# Patient Record
Sex: MALE | Race: BLACK OR AFRICAN AMERICAN | NOT HISPANIC OR LATINO | Employment: FULL TIME | URBAN - NONMETROPOLITAN AREA
[De-identification: names, ages, dates, MRNs, and addresses within clinical notes are randomized per-mention and may not be internally consistent; named-entity substitution may affect disease eponyms.]

---

## 2020-06-06 ENCOUNTER — APPOINTMENT (OUTPATIENT)
Dept: GENERAL RADIOLOGY | Facility: HOSPITAL | Age: 30
End: 2020-06-06

## 2020-06-06 ENCOUNTER — HOSPITAL ENCOUNTER (EMERGENCY)
Facility: HOSPITAL | Age: 30
Discharge: HOME | End: 2020-06-06
Attending: EMERGENCY MEDICINE | Admitting: EMERGENCY MEDICINE

## 2020-06-06 VITALS
OXYGEN SATURATION: 99 % | WEIGHT: 153 LBS | HEIGHT: 72 IN | DIASTOLIC BLOOD PRESSURE: 86 MMHG | BODY MASS INDEX: 20.72 KG/M2 | TEMPERATURE: 98.2 F | RESPIRATION RATE: 17 BRPM | SYSTOLIC BLOOD PRESSURE: 118 MMHG | HEART RATE: 90 BPM

## 2020-06-06 DIAGNOSIS — R00.0 TACHYCARDIA: ICD-10-CM

## 2020-06-06 DIAGNOSIS — R00.2 PALPITATION: Primary | ICD-10-CM

## 2020-06-06 LAB
ALBUMIN SERPL-MCNC: 5 G/DL (ref 3.5–5.2)
ALBUMIN/GLOB SERPL: 1.4 G/DL
ALP SERPL-CCNC: 71 U/L (ref 39–117)
ALT SERPL W P-5'-P-CCNC: 13 U/L (ref 1–41)
AMPHET+METHAMPHET UR QL: NEGATIVE
AMPHETAMINES UR QL: NEGATIVE
ANION GAP SERPL CALCULATED.3IONS-SCNC: 18 MMOL/L (ref 5–15)
AST SERPL-CCNC: 35 U/L (ref 1–40)
BARBITURATES UR QL SCN: NEGATIVE
BASOPHILS # BLD AUTO: 0.05 10*3/MM3 (ref 0–0.2)
BASOPHILS NFR BLD AUTO: 0.7 % (ref 0–1.5)
BENZODIAZ UR QL SCN: NEGATIVE
BILIRUB SERPL-MCNC: 0.6 MG/DL (ref 0.2–1.2)
BUN BLD-MCNC: 12 MG/DL (ref 6–20)
BUN/CREAT SERPL: 14.8 (ref 7–25)
BUPRENORPHINE SERPL-MCNC: NEGATIVE NG/ML
CALCIUM SPEC-SCNC: 10 MG/DL (ref 8.6–10.5)
CANNABINOIDS SERPL QL: NEGATIVE
CHLORIDE SERPL-SCNC: 98 MMOL/L (ref 98–107)
CO2 SERPL-SCNC: 24 MMOL/L (ref 22–29)
COCAINE UR QL: NEGATIVE
CREAT BLD-MCNC: 0.81 MG/DL (ref 0.76–1.27)
DEPRECATED RDW RBC AUTO: 39.1 FL (ref 37–54)
EOSINOPHIL # BLD AUTO: 0.09 10*3/MM3 (ref 0–0.4)
EOSINOPHIL NFR BLD AUTO: 1.3 % (ref 0.3–6.2)
ERYTHROCYTE [DISTWIDTH] IN BLOOD BY AUTOMATED COUNT: 12.9 % (ref 12.3–15.4)
GFR SERPL CREATININE-BSD FRML MDRD: 111 ML/MIN/1.73
GFR SERPL CREATININE-BSD FRML MDRD: 135 ML/MIN/1.73
GLOBULIN UR ELPH-MCNC: 3.5 GM/DL
GLUCOSE BLD-MCNC: 122 MG/DL (ref 65–99)
HCT VFR BLD AUTO: 42.1 % (ref 37.5–51)
HGB BLD-MCNC: 14.3 G/DL (ref 13–17.7)
HOLD SPECIMEN: NORMAL
HOLD SPECIMEN: NORMAL
IMM GRANULOCYTES # BLD AUTO: 0.02 10*3/MM3 (ref 0–0.05)
IMM GRANULOCYTES NFR BLD AUTO: 0.3 % (ref 0–0.5)
LYMPHOCYTES # BLD AUTO: 1.69 10*3/MM3 (ref 0.7–3.1)
LYMPHOCYTES NFR BLD AUTO: 24.8 % (ref 19.6–45.3)
MCH RBC QN AUTO: 28.8 PG (ref 26.6–33)
MCHC RBC AUTO-ENTMCNC: 34 G/DL (ref 31.5–35.7)
MCV RBC AUTO: 84.9 FL (ref 79–97)
METHADONE UR QL SCN: NEGATIVE
MONOCYTES # BLD AUTO: 0.48 10*3/MM3 (ref 0.1–0.9)
MONOCYTES NFR BLD AUTO: 7 % (ref 5–12)
NEUTROPHILS # BLD AUTO: 4.48 10*3/MM3 (ref 1.7–7)
NEUTROPHILS NFR BLD AUTO: 65.9 % (ref 42.7–76)
NRBC BLD AUTO-RTO: 0 /100 WBC (ref 0–0.2)
OPIATES UR QL: NEGATIVE
OXYCODONE UR QL SCN: NEGATIVE
PCP UR QL SCN: NEGATIVE
PLATELET # BLD AUTO: 254 10*3/MM3 (ref 140–450)
PMV BLD AUTO: 10.5 FL (ref 6–12)
POTASSIUM BLD-SCNC: 3.6 MMOL/L (ref 3.5–5.2)
PROPOXYPH UR QL: NEGATIVE
PROT SERPL-MCNC: 8.5 G/DL (ref 6–8.5)
RBC # BLD AUTO: 4.96 10*6/MM3 (ref 4.14–5.8)
SODIUM BLD-SCNC: 140 MMOL/L (ref 136–145)
TRICYCLICS UR QL SCN: NEGATIVE
TROPONIN T SERPL-MCNC: <0.01 NG/ML (ref 0–0.03)
WBC NRBC COR # BLD: 6.81 10*3/MM3 (ref 3.4–10.8)
WHOLE BLOOD HOLD SPECIMEN: NORMAL
WHOLE BLOOD HOLD SPECIMEN: NORMAL

## 2020-06-06 PROCEDURE — 93005 ELECTROCARDIOGRAM TRACING: CPT | Performed by: EMERGENCY MEDICINE

## 2020-06-06 PROCEDURE — 84484 ASSAY OF TROPONIN QUANT: CPT | Performed by: EMERGENCY MEDICINE

## 2020-06-06 PROCEDURE — 80053 COMPREHEN METABOLIC PANEL: CPT | Performed by: EMERGENCY MEDICINE

## 2020-06-06 PROCEDURE — 80306 DRUG TEST PRSMV INSTRMNT: CPT | Performed by: EMERGENCY MEDICINE

## 2020-06-06 PROCEDURE — 71045 X-RAY EXAM CHEST 1 VIEW: CPT

## 2020-06-06 PROCEDURE — 99284 EMERGENCY DEPT VISIT MOD MDM: CPT

## 2020-06-06 PROCEDURE — 93010 ELECTROCARDIOGRAM REPORT: CPT | Performed by: INTERNAL MEDICINE

## 2020-06-06 PROCEDURE — 85025 COMPLETE CBC W/AUTO DIFF WBC: CPT | Performed by: EMERGENCY MEDICINE

## 2020-06-06 RX ADMIN — SODIUM CHLORIDE 500 ML: 9 INJECTION, SOLUTION INTRAVENOUS at 12:25

## 2020-06-06 NOTE — ED PROVIDER NOTES
Subjective   Patient states that he was on a liquid diet and today was making some smoothie for himself and starting feeling tachycardic no loss of consciousness was seen in urgent care reportedly heart rate 180 was sent to the ER in the ER his heart rate is 118 he states that probably because of the beautiful nurses are taking care of him his heart rate has subsided      Palpitations   Palpitations quality:  Regular  Onset quality:  Sudden  Timing:  Constant  Progression:  Unchanged  Chronicity:  New  Context: not anxiety, not blood loss, not bronchodilators, not dehydration, not exercise, not illicit drugs, not nicotine and not stimulant use    Relieved by:  Nothing  Worsened by:  Nothing  Ineffective treatments:  None tried  Associated symptoms: no back pain, no chest pain, no chest pressure, no cough, no diaphoresis, no dizziness, no lower extremity edema, no malaise/fatigue, no nausea, no near-syncope, no numbness, no PND, no shortness of breath, no syncope, no vomiting and no weakness    Risk factors: no diabetes mellitus, no heart disease, no hx of PE, no hx of thyroid disease and no hyperthyroidism        Review of Systems   Constitutional: Negative.  Negative for chills, diaphoresis, fatigue, fever and malaise/fatigue.   HENT: Negative.  Negative for congestion.    Respiratory: Negative.  Negative for cough, chest tightness, shortness of breath and stridor.    Cardiovascular: Positive for palpitations. Negative for chest pain, syncope, PND and near-syncope.   Gastrointestinal: Negative.  Negative for abdominal distention, abdominal pain, nausea and vomiting.   Endocrine: Negative.    Genitourinary: Negative.  Negative for difficulty urinating and flank pain.   Musculoskeletal: Negative.  Negative for back pain.   Skin: Negative.  Negative for color change.   Neurological: Negative.  Negative for dizziness, weakness, numbness and headaches.   All other systems reviewed and are negative.      Past Medical  History:   Diagnosis Date   • Anxiety        No Known Allergies    History reviewed. No pertinent surgical history.    History reviewed. No pertinent family history.    Social History     Socioeconomic History   • Marital status: Single     Spouse name: Not on file   • Number of children: Not on file   • Years of education: Not on file   • Highest education level: Not on file   Tobacco Use   • Smoking status: Former Smoker   • Tobacco comment: 4 years ago   Substance and Sexual Activity   • Alcohol use: Yes     Comment: occ   • Drug use: Never           Objective   Physical Exam   Constitutional: He is oriented to person, place, and time. He appears well-developed.  Non-toxic appearance.   HENT:   Head: Normocephalic and atraumatic.   Mouth/Throat: Uvula is midline and mucous membranes are normal.   Eyes: Pupils are equal, round, and reactive to light. Conjunctivae and lids are normal. Lids are everted and swept, no foreign bodies found.   Neck: Trachea normal, normal range of motion, full passive range of motion without pain and phonation normal. Neck supple. Normal carotid pulses and no JVD present. Carotid bruit is not present. No neck rigidity. No tracheal deviation present.   Cardiovascular: Regular rhythm, normal heart sounds, intact distal pulses and normal pulses. Tachycardia present. PMI is not displaced.   Pulmonary/Chest: Effort normal and breath sounds normal. No accessory muscle usage or stridor. No apnea and no tachypnea. He has no decreased breath sounds. He has no wheezes. He has no rhonchi. He has no rales.   Abdominal: Soft. Normal appearance, normal aorta and bowel sounds are normal. There is no hepatosplenomegaly. There is no tenderness.   Musculoskeletal: Normal range of motion.   Lower extremity exam bilaterally is unremarkable.  There is no right or left calf tenderness .  There is no palpable venous cord.  No obvious difference in the size of the legs.  No pitting edema.  The dorsalis pedis  and posterior tibial femoral and popliteal pulses are palpable and +2 bilaterally.  Homans sign is negative   Neurological: He is alert and oriented to person, place, and time. He has normal strength and normal reflexes. He displays normal reflexes. No cranial nerve deficit or sensory deficit. Gait normal. GCS eye subscore is 4. GCS verbal subscore is 5. GCS motor subscore is 6.   Skin: Skin is warm, dry and intact. No cyanosis. No pallor. Nails show no clubbing.   Psychiatric: He has a normal mood and affect. His speech is normal and behavior is normal.   Nursing note and vitals reviewed.      Procedures           ED Course  ED Course as of Jun 06 1354   Sat Jun 06, 2020   1349 Case discussed at length with the patient EKG shows normal sinus rhythm without any acute abnormality or pathology there is no fast heart rate currently.  It may be possible the patient may have had narrow complex tachycardia I have discussed this at length and will discharge him home on a Holter monitor and echo    [TS]   1350 Risks and benefits of treatments given and alternative treatment options discussed with patient/family. I answered all the questions in simple, plain language, and there was voiced understanding and agreement with plan of care. There were no further questions. Differential diagnosis discussed. Patient/family was advised that the practice of medicine is not always an exact science, and sometimes tests, physical exam, or history may not show the underlying conditions with certainty. Additionally, the condition may change or show itself later after initial presentation. There was also expressed understanding and agreement with this limitation of emergency medicine practice. Patient/family was asked to return to ED if any problem or issues or if condition worsens or does not improved. Patient/family agreed to follow up with PCP/specialist as advised, or return to ED if unable to see a provider in a timely fashion for  continued symptoms.     [TS]   1351  The patient's symptoms are now better.  Patient is not having pain.  No chest pain, difficulty breathing, nausea, vomiting or palpitations.  No anxiety or dizziness.  Vital signs have been reviewed and appear to be correct.  Physical exam findings are improved.  Alert.  Oriented X3 .  No acute distress.  Breath sounds normal.  No respiratory distress, decreased breath sounds or wheezes.  Normal heart rate and rhythm.  Heart sounds normal.  .  Abdomen soft and nontender.  No abdominal tenderness or guarding or rebound tenderness.  Skin warm and dry.  No cyanosis or diaphoresis.  Oriented X 3.  Not anxious.  No alteration in mental status or weakness.            [TS]      ED Course User Index  [TS] Alvaro Chang MD                                           MDM  Number of Diagnoses or Management Options  Diagnosis management comments: Could have had an SVT or any other narrow complex tachycardia       Amount and/or Complexity of Data Reviewed  Clinical lab tests: ordered and reviewed  Tests in the radiology section of CPT®: ordered and reviewed  Tests in the medicine section of CPT®: ordered and reviewed        Final diagnoses:   Palpitation   Tachycardia            Alvaro Chang MD  06/06/20 4000

## 2020-06-06 NOTE — DISCHARGE INSTRUCTIONS
Follow up with one of the [facility] physician groups below to setup primary care. If you have trouble making an appointment, please call the [facility] Nurse Line at [phone]    Dr. Gila Smith DO, Dr. Chandler Mejía DO, and KATIA Hagan  [facility]  [address] [place] [address], [id number] [phone]    Dr. Rodolfo Stevens MD  [facility] Internal Medicine - [address] [address], [address], [place], [place] [id number] [phone]    Dr. Angel Diaz DO, Dr. Jeffery Mercer DO,  KATIA Yu, and KATIA Baldwin  [facility] - [address] [address], [address], [place], [place] [id number] [phone]     Dr. Consuelo Maradiaga MD, and KATIA Miranda  [address] [id number] [phone]    Dr. Ricky Schultz MD and Dr. Nabeel Denis MD  [address], [address]  [phone]    Dr. Bao Chanel MD  [facility]  [address] [place] [address], [address], [place], [place], [address] [phone]    Dr. Fredi Dawn MD  [facility]  [address], [place], [address] [phone]                Palpitations  Palpitations are feelings that your heartbeat is irregular or is faster than normal. It may feel like your heart is fluttering or skipping a beat. Palpitations are usually not a serious problem. They may be caused by many things, including smoking, caffeine, alcohol, stress, and certain medicines or drugs. Most causes of palpitations are not serious. However, some palpitations can be a sign of a serious problem. You may need further tests to rule out serious medical problems.  Follow these instructions at home:         Pay  attention to any changes in your condition. Take these actions to help manage your symptoms:  Eating and drinking  · Avoid foods and drinks that may cause palpitations. These may include:  ? Caffeinated coffee, tea, soft drinks, diet pills, and energy drinks.  ? Chocolate.  ? Alcohol.  Lifestyle  · Take steps to reduce your stress and anxiety. Things that can help you relax include:  ? Yoga.  ? Mind-body activities, such as deep breathing, meditation, or using words and images to create positive thoughts (guided imagery).  ? Physical activity, such as swimming, jogging, or walking. Tell your health care provider if your palpitations increase with activity. If you have chest pain or shortness of breath with activity, do not continue the activity until you are seen by your health care provider.  ? Biofeedback. This is a method that helps you learn to use your mind to control things in your body, such as your heartbeat.  · Do not use drugs, including cocaine or ecstasy. Do not use marijuana.  · Get plenty of rest and sleep. Keep a regular bed time.  General instructions  · Take over-the-counter and prescription medicines only as told by your health care provider.  · Do not use any products that contain nicotine or tobacco, such as cigarettes and e-cigarettes. If you need help quitting, ask your health care provider.  · Keep all follow-up visits as told by your health care provider. This is important. These may include visits for further testing if palpitations do not go away or get worse.  Contact a health care provider if you:  · Continue to have a fast or irregular heartbeat after 24 hours.  · Notice that your palpitations occur more often.  Get help right away if you:  · Have chest pain or shortness of breath.  · Have a severe headache.  · Feel dizzy or you faint.  Summary  · Palpitations are feelings that your heartbeat is irregular or is faster than normal. It may feel like your heart is fluttering or skipping a  beat.  · Palpitations may be caused by many things, including smoking, caffeine, alcohol, stress, certain medicines, and drugs.  · Although most causes of palpitations are not serious, some causes can be a sign of a serious medical problem.  · Get help right away if you faint or have chest pain, shortness of breath, a severe headache, or dizziness.  This information is not intended to replace advice given to you by your health care provider. Make sure you discuss any questions you have with your health care provider.  Document Released: 12/15/2001 Document Revised: 01/30/2019 Document Reviewed: 01/30/2019  Elsevier Patient Education © 2020 [employer]

## 2020-06-16 ENCOUNTER — OFFICE VISIT (OUTPATIENT)
Dept: INTERNAL MEDICINE | Facility: CLINIC | Age: 30
End: 2020-06-16

## 2020-06-16 VITALS
SYSTOLIC BLOOD PRESSURE: 120 MMHG | RESPIRATION RATE: 18 BRPM | HEART RATE: 84 BPM | HEIGHT: 72 IN | BODY MASS INDEX: 20.83 KG/M2 | OXYGEN SATURATION: 99 % | WEIGHT: 153.8 LBS | TEMPERATURE: 98.5 F | DIASTOLIC BLOOD PRESSURE: 76 MMHG

## 2020-06-16 DIAGNOSIS — Z11.59 ENCOUNTER FOR HEPATITIS C SCREENING TEST FOR LOW RISK PATIENT: ICD-10-CM

## 2020-06-16 DIAGNOSIS — F41.9 ANXIETY: ICD-10-CM

## 2020-06-16 DIAGNOSIS — Z13.31 DEPRESSION SCREEN: ICD-10-CM

## 2020-06-16 DIAGNOSIS — Z13.6 HYPERTENSION SCREEN: ICD-10-CM

## 2020-06-16 DIAGNOSIS — Z00.00 ENCOUNTER FOR PREVENTIVE CARE: Primary | ICD-10-CM

## 2020-06-16 PROCEDURE — 99385 PREV VISIT NEW AGE 18-39: CPT | Performed by: INTERNAL MEDICINE

## 2020-06-16 RX ORDER — UBIDECARENONE 100 MG
100 CAPSULE ORAL DAILY
COMMUNITY
End: 2021-01-15

## 2020-06-16 RX ORDER — ECHINACEA 400 MG
1000 CAPSULE ORAL DAILY
COMMUNITY
End: 2021-01-15

## 2020-06-16 NOTE — PROGRESS NOTES
Chief Complaint   Patient presents with   • Establish Care   • Anxiety     Answers for HPI/ROS submitted by the patient on 6/14/2020   What is the primary reason for your visit?: Other  Please describe your symptoms.: no symptoms. just a routine check up  Have you had these symptoms before?: No  How long have you been having these symptoms?: 1-4 days      History:  Jamal Hutchinson is a 30 y.o. male who presents today for evaluation of the above problems.      Patient is here to establish care with me.  He was recently seen at both urgent care in the emergency room on June 6, 2020.  He had some dizziness, and felt like he might be having a panic attack.  EKG showed normal sinus rhythm without acute abnormality.  He was discharged with Holter monitor and order for echocardiogram.  After treatment in the ER his symptoms improved.  He was referred here to establish care.    I reviewed his most recent labs from June 6, 2020 which showed mild increase in anion gap, mild hyperglycemia, normal urine tox screen.  Chest xray was normal    On his birthday he went to [place].  Afterwards he had a juicer for his birthday and he went on a cleanse for 3 days only drinking juice and water.  He was feeling well and then all of a sudden while at work he developed heart racing.  Felt like anxiety and had to previous panic attack.  He has a 2D echo and Holter monitor scheduled for June 23, 2020    Currently he feels well although has some anxiety related to fear of having more anxiety attacks for tachycardia attacks.  He recently picked up a book that he is reading to help with his anxiety.  He does not have a counselor yet.    There is no family history of coronary artery disease, sudden cardiac death, diabetes, hypertension or other issues.  Denies any history of colon or prostate cancer        HPI    ROS:  Review of Systems   Constitutional: Negative for activity change, appetite change, fatigue, fever and unexpected weight change.    HENT: Negative for nosebleeds, sore throat and trouble swallowing.    Eyes: Negative for pain and visual disturbance.   Respiratory: Negative for cough, chest tightness and shortness of breath.    Cardiovascular: Positive for palpitations. Negative for chest pain and leg swelling.   Gastrointestinal: Negative for abdominal pain, constipation, diarrhea, nausea and vomiting.   Endocrine: Negative for cold intolerance and heat intolerance.   Genitourinary: Negative for hematuria.   Musculoskeletal: Negative.  Negative for back pain, neck pain and neck stiffness.   Skin: Negative for rash and wound.   Neurological: Negative for dizziness, syncope, weakness, light-headedness and headaches.   Hematological: Negative for adenopathy. Does not bruise/bleed easily.   Psychiatric/Behavioral: Negative for agitation, behavioral problems and confusion. The patient is nervous/anxious.        No Known Allergies  Past Medical History:   Diagnosis Date   • Anxiety      History reviewed. No pertinent surgical history.  Family History   Problem Relation Age of Onset   • Anxiety disorder Mother    • Cancer Mother    • Cancer Father      Jamal  reports that he has quit smoking. His smoking use included cigarettes. He has a 7.00 pack-year smoking history. He has never used smokeless tobacco. He reports that he drinks alcohol. He reports that he does not use drugs.    I have reviewed and updated the above documentation (if necessary) including but not limited to chief complaint, ROS, PFSH, allergies and medications      Current Outpatient Medications:   •  CHLOROPHYLL PO, Take 60 mg by mouth Daily., Disp: , Rfl:   •  coenzyme Q10 100 MG capsule, Take 100 mg by mouth Daily., Disp: , Rfl:   •  Flaxseed, Linseed, (FLAXSEED OIL) 1000 MG capsule, Take 1,000 mg by mouth Daily., Disp: , Rfl:   •  Vitamins-Lipotropics (B-100 PO), Take 1 tablet by mouth Daily., Disp: , Rfl:     PHQ-9 Depression Screening  Little interest or pleasure in doing  "things? 0   Feeling down, depressed, or hopeless? 0   Trouble falling or staying asleep, or sleeping too much?     Feeling tired or having little energy?     Poor appetite or overeating?     Feeling bad about yourself - or that you are a failure or have let yourself or your family down?     Trouble concentrating on things, such as reading the newspaper or watching television?     Moving or speaking so slowly that other people could have noticed? Or the opposite - being so fidgety or restless that you have been moving around a lot more than usual?     Thoughts that you would be better off dead, or of hurting yourself in some way?     PHQ-9 Total Score 0   If you checked off any problems, how difficult have these problems made it for you to do your work, take care of things at home, or get along with other people?       PHQ-9 Total Score: 0    OBJECTIVE:  Visit Vitals  /76 (BP Location: Left arm, Patient Position: Sitting, Cuff Size: Adult)   Pulse 84   Temp 98.5 °F (36.9 °C) (Oral)   Resp 18   Ht 182.9 cm (72.01\")   Wt 69.8 kg (153 lb 12.8 oz)   SpO2 99%   BMI 20.85 kg/m²      Physical Exam   Constitutional: He appears well-developed and well-nourished. No distress.   Very pleasant   HENT:   Head: Normocephalic and atraumatic.   Right Ear: External ear normal.   Left Ear: External ear normal.   Mouth/Throat: Oropharynx is clear and moist.   Eyes: Pupils are equal, round, and reactive to light. EOM are normal. No scleral icterus.   Neck: Phonation normal. No thyromegaly present.   Cardiovascular: Normal rate, regular rhythm, normal heart sounds and intact distal pulses. Exam reveals no friction rub.   No murmur heard.  Pulmonary/Chest: Effort normal and breath sounds normal. No stridor. No respiratory distress. He has no wheezes. He has no rales.   Abdominal: Soft. Bowel sounds are normal. He exhibits no distension. There is no tenderness.   Neurological: He is alert.   Skin: Skin is dry. No erythema. No pallor. "   Psychiatric: He has a normal mood and affect. His behavior is normal. Judgment and thought content normal.   Vitals reviewed.      MDM    Assessment/Plan    Jamal was seen today for establish care and anxiety.    Diagnoses and all orders for this visit:    Encounter for preventive care  -     Lipid panel; Future    Anxiety    Encounter for hepatitis C screening test for low risk patient  -     Hepatitis C antibody; Future    Depression screen    Hypertension screen      I suspect his episode of tachycardia was related to a panic attack.  However, his heart rate was 150 and I am interested to see if he did have an episode of SVT.  He already has 2D echo and Holter monitor ordered.  I did review his EKG from June 6.  This was a sinus tachycardia.  There is no family history of sudden cardiac death or other cardiac issues.    His hypertension and depression screen are both negative    Would like to check a lipid panel and hepatitis C as above    Follow-up in 1 year as long as work-up above is within normal limits    Patient's Body mass index is 20.85 kg/m². BMI is within normal parameters. No follow-up required..      Education materials and an After Visit Summary were printed and given to the patient at discharge.  Return in about 1 year (around 6/16/2021) for Annual physical.         SIMONA Stevens MD   2:24 PM  6/16/2020

## 2020-06-16 NOTE — PATIENT INSTRUCTIONS
Living With Anxiety    After being diagnosed with an anxiety disorder, you may be relieved to know why you have felt or behaved a certain way. It is natural to also feel overwhelmed about the treatment ahead and what it will mean for your life. With care and support, you can manage this condition and recover from it.  How to cope with anxiety  Dealing with stress  Stress is your body’s reaction to life changes and events, both good and bad. Stress can last just a few hours or it can be ongoing. Stress can play a major role in anxiety, so it is important to learn both how to cope with stress and how to think about it differently.  Talk with your health care provider or a counselor to learn more about stress reduction. He or she may suggest some stress reduction techniques, such as:  · Music therapy. This can include creating or listening to music that you enjoy and that inspires you.  · Mindfulness-based meditation. This involves being aware of your normal breaths, rather than trying to control your breathing. It can be done while sitting or walking.  · Centering prayer. This is a kind of meditation that involves focusing on a word, phrase, or sacred image that is meaningful to you and that brings you peace.  · Deep breathing. To do this, expand your stomach and inhale slowly through your nose. Hold your breath for 3-5 seconds. Then exhale slowly, allowing your stomach muscles to relax.  · Self-talk. This is a skill where you identify thought patterns that lead to anxiety reactions and correct those thoughts.  · Muscle relaxation. This involves tensing muscles then relaxing them.  Choose a stress reduction technique that fits your lifestyle and personality. Stress reduction techniques take time and practice. Set aside 5-15 minutes a day to do them. Therapists can offer training in these techniques. The training may be covered by some insurance plans. Other things you can do to manage stress include:  · Keeping a  stress diary. This can help you learn what triggers your stress and ways to control your response.  · Thinking about how you respond to certain situations. You may not be able to control everything, but you can control your reaction.  · Making time for activities that help you relax, and not feeling guilty about spending your time in this way.  Therapy combined with coping and stress-reduction skills provides the best chance for successful treatment.  Medicines  Medicines can help ease symptoms. Medicines for anxiety include:  · Anti-anxiety drugs.  · Antidepressants.  · Beta-blockers.  Medicines may be used as the main treatment for anxiety disorder, along with therapy, or if other treatments are not working. Medicines should be prescribed by a health care provider.  Relationships  Relationships can play a big part in helping you recover. Try to spend more time connecting with trusted friends and family members. Consider going to couples counseling, taking family education classes, or going to family therapy. Therapy can help you and others better understand the condition.  How to recognize changes in your condition  Everyone has a different response to treatment for anxiety. Recovery from anxiety happens when symptoms decrease and stop interfering with your daily activities at home or work. This may mean that you will start to:  · Have better concentration and focus.  · Sleep better.  · Be less irritable.  · Have more energy.  · Have improved memory.  It is important to recognize when your condition is getting worse. Contact your health care provider if your symptoms interfere with home or work and you do not feel like your condition is improving.  Where to find help and support:  You can get help and support from these sources:  · Self-help groups.  · Online and community organizations.  · A trusted spiritual leader.  · Couples counseling.  · Family education classes.  · Family therapy.  Follow these instructions  at home:  · Eat a healthy diet that includes plenty of vegetables, fruits, whole grains, low-fat dairy products, and lean protein. Do not eat a lot of foods that are high in solid fats, added sugars, or salt.  · Exercise. Most adults should do the following:  ? Exercise for at least 150 minutes each week. The exercise should increase your heart rate and make you sweat (moderate-intensity exercise).  ? Strengthening exercises at least twice a week.  · Cut down on caffeine, tobacco, alcohol, and other potentially harmful substances.  · Get the right amount and quality of sleep. Most adults need 7-9 hours of sleep each night.  · Make choices that simplify your life.  · Take over-the-counter and prescription medicines only as told by your health care provider.  · Avoid caffeine, alcohol, and certain over-the-counter cold medicines. These may make you feel worse. Ask your pharmacist which medicines to avoid.  · Keep all follow-up visits as told by your health care provider. This is important.  Questions to ask your health care provider  · Would I benefit from therapy?  · How often should I follow up with a health care provider?  · How long do I need to take medicine?  · Are there any long-term side effects of my medicine?  · Are there any alternatives to taking medicine?  Contact a health care provider if:  · You have a hard time staying focused or finishing daily tasks.  · You spend many hours a day feeling worried about everyday life.  · You become exhausted by worry.  · You start to have headaches, feel tense, or have nausea.  · You urinate more than normal.  · You have diarrhea.  Get help right away if:  · You have a racing heart and shortness of breath.  · You have thoughts of hurting yourself or others.  If you ever feel like you may hurt yourself or others, or have thoughts about taking your own life, get help right away. You can go to your nearest emergency department or call:  · Your local emergency services  (911 in the [place]  · A suicide crisis helpline, such as the National Suicide Prevention Lifeline at 1-573.907.3693. This is open 24-hours a day.  Summary  · Taking steps to deal with stress can help calm you.  · Medicines cannot cure anxiety disorders, but they can help ease symptoms.  · Family, friends, and partners can play a big part in helping you recover from an anxiety disorder.  This information is not intended to replace advice given to you by your health care provider. Make sure you discuss any questions you have with your health care provider.  Document Released: 12/12/2017 Document Revised: 11/30/2018 Document Reviewed: 12/12/2017  [employer] Patient Education © 2020 [employer]    Supporting Someone With Anxiety  Anxiety disorders are mental health conditions that cause overwhelming feelings of nervousness or worry. These feelings interfere with daily activities and relationships. Anxiety disorders include:  · Generalized anxiety disorder (BRYCE).  · Social anxiety.  · Post-traumatic stress disorder (PTSD).  When a person has an anxiety disorder, his or her condition can affect others around him or her, such as friends and family members. Friends and family can help by offering support and understanding.  What do I need to know about this condition?  Anxiety is the mental and physical experience of nervousness or worry that you might feel when you think about a stressful event. Occasional anxiety is normal, but a person with an anxiety disorder becomes preoccupied with this worry. He or she may know that the anxiety is not logical, but knowing this does not relieve the discomfort that he or she feels. Anxiety disorders cause a great deal of distress and prevent someone from having a normal daily life. Someone with an anxiety disorder may:  · Experience anxiety that:  ? May or may not have a specific trigger.  ? Lasts for long periods of time.  ? Causes physical problems over time.  ? Is far more intense  than normal anticipation.  ? Occurs at unpredictable times.  · Feel restless or edgy.  · Get fatigued easily.  · Have trouble focusing.  · Have muscle tension.  · Have trouble falling asleep or staying asleep.  · Be irritable and occasionally have sudden expressions of strong feelings (outbursts).  · Have worries that do not make sense to you.  What do I need to know about the treatment options?  Anxiety disorders are generally very treatable by mental health providers such as psychologists, psychiatrists, and clinical social workers. Treatment may include one or more of the following:  · Psychotherapy, also called talk therapy or counseling. Types of psychotherapy that are used to treat anxiety include:  ? Cognitive behavioral therapy (CBT). This type of therapy teaches a person how to recognize unhealthy feelings, thoughts, and behaviors, and how to replace those feelings with positive thoughts and actions.  ? Behavior therapy that trains a person to relax and self-soothe. This also involves gradually exposing the person to the cause of the anxiety (progressive exposure therapy).  ? Biofeedback. This type of therapy focuses on trying to control certain body functions, like heart rate, to lessen the physical impact of anxiety.  ? Mindfulness-based stress reduction training. This uses education, meditation, and yoga to help a person stay focused on the present instead of living in the past or worrying about the future.  ? Acceptance and commitment therapy (ACT). This helps a person to focus on acceptance, rather than trying to control every situation.  ? Family therapy. This treatment helps family members to communicate and deal with conflict in healthy ways.  · Medicine to treat anxiety and help to control certain emotions and behaviors.  · Mind-body programs. These programs encourage the person with anxiety to be involved in his or her treatment and feel empowered. Mind-body programs may include mindfulness-based  "stress reduction training, yoga, or jason chi.  How can I create a safe environment?  · For certain types of anxiety, such as PTSD, you may want to:  ? Remove alcohol and prescription medicines from your loved one's home, or limit the amount of these substances in the home. This can help to prevent your loved one from abusing alcohol and prescription medicines.  ? Remove or lock up guns and other weapons. If you do not have a safe place to keep a gun, local law enforcement may store a gun for you.  · Make a written crisis plan. Include important phone numbers, such as the local crisis intervention team. Make sure that:  ? The person with anxiety knows about this plan and agrees with it.  ? Everyone who has regular contact with the person knows about the plan and knows what to do in an emergency.  ? The written plan is easily accessible and can be quickly put into action.  How should I care for myself?  It is important to find ways to care for your body, mind, and well-being while supporting someone with anxiety.  · Try to maintain your normal routines. This can help you remember that your life is about more than your loved one's condition.  · Understand what your limits are. Say \"no\" to requests or events that lead to a schedule that is too busy.  · Make time for activities that help you relax, and try to not feel guilty about taking time for yourself.  · Spend time with friends and family.  · Consider trying meditation and deep breathing exercises to lower your stress. Attend some mind-body classes by yourself or with your loved one.  · Get plenty of sleep.  · Exercise, even if it is just taking a short walk a few times a week.  · If you are struggling emotionally with guilt, fear, or anger, consider working with a therapist.  What are some signs that the condition is getting worse?  Signs that your loved one's condition may be getting worse include:  · Dramatic mood swings.  · Staying away from activities that he or " "she used to enjoy.  · Drinking more alcohol than normal.  · Either seeming tearful or seeming to lack emotion.  · Talking about \"not feeling right.\"  · Staying away from others (isolating himself or herself).  Where to find support  Talk about the condition  Good communication is the key to supporting your friend or family member. Here are a few things to keep in mind:  · Be careful about too much prodding. Try not to overdo reminders to an adult friend or family member about things like taking medicines. Ask how your loved one prefers that you help.  · Ask questions and then listen to your loved one's response. Be available if your friend or family member wants to talk, but give your loved one space if he or she does not feel like talking.  · Never ignore comments about suicide, and do not try to avoid the subject of suicide. Talking about suicide will not make your loved one want to act on it. You or your loved one can reach out 24 hours a day to get free, private support (on the phone or a live online chat) from a suicide crisis helpline, such as the National Suicide Prevention Lifeline at 1-738.836.3769.  · Be encouraging and offer emotional support. This can help to lower stress. Even saying something simple to comfort your loved one may help.  · If your loved one is open to it, go with him or her to visits with a counselor or health care provider. Get suggestions directly from your loved one's care providers about when to get help if you are concerned about behavior changes. Privacy laws limit how much a person's health care provider can share with you without your loved one's permission, but if you feel that a situation is an emergency, do not wait to call a health care provider or emergency services.  Find support and resources  · Consider joining self-help and support groups, not only for your friend or family member, but also for yourself. People in these peer and family support groups understand what you " and your loved one are going through. They can help you feel a sense of hope and connect you with local resources to help you learn more.  ? You may also consider family therapy.  General support  · Make an effort to learn all you can about your loved one's form of anxiety.  · Include your loved one in activities. Invite him or her to go for walks and outings.  · Help your loved one follow his or her treatment plan as directed by health care providers. This could mean driving him or her to therapy sessions or suggesting ways to cope with stress.  · Remember that your support really matters. Social support is a huge benefit for someone who is coping with anxiety.  Where to find more information  A health care provider may be able to recommend mental health resources that are available online or over the phone. You could start with:  · Government sites such as the Substance Abuse and Mental Health Services Administration (SAMHSA): www.samhsa.gov  · National mental health organizations such as the National [place] [employer]ness (ANA LUISA): www.ana luisa.org  Get help right away if:  · Your loved one expresses thoughts about harming himself or herself or others.  · Your loved one's behavior becomes hard to predict (erratic).  · Your loved one shows behavior that does not make sense with the current time, place, or circumstances. These behaviors may include seeing, feeling, tasting, or hearing things that are not real (hallucinations) or having flashbacks.  If you ever feel like your loved one may hurt himself or herself or others, or may have thoughts about taking his or her own life, get help right away. You can go to your nearest emergency department or call:  · Your local emergency services (911 in the [place]  · A suicide crisis helpline, such as the National Suicide Prevention Lifeline at 1-590.379.4159. This is open 24 hours a day.  Summary  · People with anxiety disorders experience overwhelming feelings of  nervousness or worry. Friends and family can help by offering support and understanding.  · Anxiety disorders are generally very treatable by mental health providers. They can be treated with psychotherapy (also known as talk therapy), behavior therapy, medicine, and mind-body programs.  · Be compassionate and listen to your loved one. Be available if your friend or family member wants to talk, but give your loved one space if he or she does not feel like talking.  · Find ways to care for your own body, mind, and well-being while supporting someone with anxiety. Try to maintain your normal routines and make time to do things that you enjoy.  This information is not intended to replace advice given to you by your health care provider. Make sure you discuss any questions you have with your health care provider.  Document Released: 05/01/2018 Document Revised: 04/09/2020 Document Reviewed: 05/01/2018  [employer] Patient Education © 2020 [employer]    Preventive Care 21-39 Years Old, Male  Preventive care refers to lifestyle choices and visits with your health care provider that can promote health and wellness. This includes:  · A yearly physical exam. This is also called an annual well check.  · Regular dental and eye exams.  · Immunizations.  · Screening for certain conditions.  · Healthy lifestyle choices, such as eating a healthy diet, getting regular exercise, not using drugs or products that contain nicotine and tobacco, and limiting alcohol use.  What can I expect for my preventive care visit?  Physical exam  Your health care provider will check:  · Height and weight. These may be used to calculate body mass index (BMI), which is a measurement that tells if you are at a healthy weight.  · Heart rate and blood pressure.  · Your skin for abnormal spots.  Counseling  Your health care provider may ask you questions about:  · Alcohol, tobacco, and drug use.  · Emotional well-being.  · Home and relationship  well-being.  · Sexual activity.  · Eating habits.  · Work and work environment.  What immunizations do I need?    Influenza (flu) vaccine  · This is recommended every year.  Tetanus, diphtheria, and pertussis (Tdap) vaccine  · You may need a Td booster every 10 years.  Varicella (chickenpox) vaccine  · You may need this vaccine if you have not already been vaccinated.  Human papillomavirus (HPV) vaccine  · If recommended by your health care provider, you may need three doses over 6 months.  Measles, mumps, and rubella (MMR) vaccine  · You may need at least one dose of MMR. You may also need a second dose.  Meningococcal conjugate (MenACWY) vaccine  · One dose is recommended if you are 19-21 years old and a first-year college student living in a residence [place], or if you have one of several medical conditions. You may also need additional booster doses.  Pneumococcal conjugate (PCV13) vaccine  · You may need this if you have certain conditions and were not previously vaccinated.  Pneumococcal polysaccharide (PPSV23) vaccine  · You may need one or two doses if you smoke cigarettes or if you have certain conditions.  Hepatitis A vaccine  · You may need this if you have certain conditions or if you travel or work in places where you may be exposed to hepatitis A.  Hepatitis B vaccine  · You may need this if you have certain conditions or if you travel or work in places where you may be exposed to hepatitis B.  Haemophilus influenzae type b (Hib) vaccine  · You may need this if you have certain risk factors.  You may receive vaccines as individual doses or as more than one vaccine together in one shot (combination vaccines). Talk with your health care provider about the risks and benefits of combination vaccines.  What tests do I need?  Blood tests  · Lipid and cholesterol levels. These may be checked every 5 years starting at age 20.  · Hepatitis C test.  · Hepatitis B test.  Screening    · Diabetes screening. This is  done by checking your blood sugar (glucose) after you have not eaten for a while (fasting).  · Sexually transmitted disease (STD) testing.  Talk with your health care provider about your test results, treatment options, and if necessary, the need for more tests.  Follow these instructions at home:  Eating and drinking    · Eat a diet that includes fresh fruits and vegetables, whole grains, lean protein, and low-fat dairy products.  · Take vitamin and mineral supplements as recommended by your health care provider.  · Do not drink alcohol if your health care provider tells you not to drink.  · If you drink alcohol:  ? Limit how much you have to 0-2 drinks a day.  ? Be aware of how much alcohol is in your drink. In the [place] one drink equals one 12 oz bottle of beer (355 mL), one 5 oz glass of wine (148 mL), or one 1½ oz glass of hard liquor (44 mL).  Lifestyle  · Take daily care of your teeth and gums.  · Stay active. Exercise for at least 30 minutes on 5 or more days each week.  · Do not use any products that contain nicotine or tobacco, such as cigarettes, e-cigarettes, and chewing tobacco. If you need help quitting, ask your health care provider.  · If you are sexually active, practice safe sex. Use a condom or other form of protection to prevent STIs (sexually transmitted infections).  What's next?  · Go to your health care provider once a year for a well check visit.  · Ask your health care provider how often you should have your eyes and teeth checked.  · Stay up to date on all vaccines.  This information is not intended to replace advice given to you by your health care provider. Make sure you discuss any questions you have with your health care provider.  Document Released: 02/13/2003 Document Revised: 12/12/2019 Document Reviewed: 12/12/2019  [employer] Patient Education © 2020 [employer]

## 2020-06-23 ENCOUNTER — HOSPITAL ENCOUNTER (OUTPATIENT)
Dept: CARDIOLOGY | Facility: HOSPITAL | Age: 30
Discharge: HOME | End: 2020-06-23
Admitting: EMERGENCY MEDICINE

## 2020-06-23 ENCOUNTER — HOSPITAL ENCOUNTER (OUTPATIENT)
Dept: CARDIOLOGY | Facility: HOSPITAL | Age: 30
Discharge: HOME | End: 2020-06-23

## 2020-06-23 VITALS
BODY MASS INDEX: 20.84 KG/M2 | SYSTOLIC BLOOD PRESSURE: 117 MMHG | DIASTOLIC BLOOD PRESSURE: 76 MMHG | HEIGHT: 72 IN | WEIGHT: 153.88 LBS

## 2020-06-23 DIAGNOSIS — R00.0 TACHYCARDIA: ICD-10-CM

## 2020-06-23 DIAGNOSIS — R00.2 PALPITATION: ICD-10-CM

## 2020-06-23 LAB
BH CV ECHO MEAS - AO MAX PG (FULL): 1 MMHG
BH CV ECHO MEAS - AO MAX PG: 8.5 MMHG
BH CV ECHO MEAS - AO MEAN PG (FULL): 1 MMHG
BH CV ECHO MEAS - AO MEAN PG: 5 MMHG
BH CV ECHO MEAS - AO ROOT AREA (BSA CORRECTED): 1.4
BH CV ECHO MEAS - AO ROOT AREA: 5.3 CM^2
BH CV ECHO MEAS - AO ROOT DIAM: 2.6 CM
BH CV ECHO MEAS - AO V2 MAX: 146 CM/SEC
BH CV ECHO MEAS - AO V2 MEAN: 103 CM/SEC
BH CV ECHO MEAS - AO V2 VTI: 31.2 CM
BH CV ECHO MEAS - AVA(I,A): 2.6 CM^2
BH CV ECHO MEAS - AVA(I,D): 2.6 CM^2
BH CV ECHO MEAS - AVA(V,A): 2.9 CM^2
BH CV ECHO MEAS - AVA(V,D): 2.9 CM^2
BH CV ECHO MEAS - BSA(HAYCOCK): 1.9 M^2
BH CV ECHO MEAS - BSA: 1.9 M^2
BH CV ECHO MEAS - BZI_BMI: 20.8 KILOGRAMS/M^2
BH CV ECHO MEAS - BZI_METRIC_HEIGHT: 182.9 CM
BH CV ECHO MEAS - BZI_METRIC_WEIGHT: 69.4 KG
BH CV ECHO MEAS - EDV(CUBED): 105.2 ML
BH CV ECHO MEAS - EDV(MOD-SP4): 77.9 ML
BH CV ECHO MEAS - EDV(TEICH): 103.4 ML
BH CV ECHO MEAS - EF(CUBED): 78.4 %
BH CV ECHO MEAS - EF(MOD-SP4): 63.9 %
BH CV ECHO MEAS - EF(TEICH): 70.7 %
BH CV ECHO MEAS - ESV(CUBED): 22.7 ML
BH CV ECHO MEAS - ESV(MOD-SP4): 28.1 ML
BH CV ECHO MEAS - ESV(TEICH): 30.3 ML
BH CV ECHO MEAS - FS: 40 %
BH CV ECHO MEAS - IVS/LVPW: 0.64
BH CV ECHO MEAS - IVSD: 0.63 CM
BH CV ECHO MEAS - LA DIMENSION: 2.6 CM
BH CV ECHO MEAS - LA/AO: 1
BH CV ECHO MEAS - LAT PEAK E' VEL: 15.4 CM/SEC
BH CV ECHO MEAS - LV DIASTOLIC VOL/BSA (35-75): 41 ML/M^2
BH CV ECHO MEAS - LV MASS(C)D: 123.1 GRAMS
BH CV ECHO MEAS - LV MASS(C)DI: 64.8 GRAMS/M^2
BH CV ECHO MEAS - LV MAX PG: 7.5 MMHG
BH CV ECHO MEAS - LV MEAN PG: 4 MMHG
BH CV ECHO MEAS - LV SYSTOLIC VOL/BSA (12-30): 14.8 ML/M^2
BH CV ECHO MEAS - LV V1 MAX: 137 CM/SEC
BH CV ECHO MEAS - LV V1 MEAN: 86.8 CM/SEC
BH CV ECHO MEAS - LV V1 VTI: 26.3 CM
BH CV ECHO MEAS - LVIDD: 4.7 CM
BH CV ECHO MEAS - LVIDS: 2.8 CM
BH CV ECHO MEAS - LVLD AP4: 7.9 CM
BH CV ECHO MEAS - LVLS AP4: 7.5 CM
BH CV ECHO MEAS - LVOT AREA (M): 3.1 CM^2
BH CV ECHO MEAS - LVOT AREA: 3.1 CM^2
BH CV ECHO MEAS - LVOT DIAM: 2 CM
BH CV ECHO MEAS - LVPWD: 0.98 CM
BH CV ECHO MEAS - MED PEAK E' VEL: 11.9 CM/SEC
BH CV ECHO MEAS - MV A MAX VEL: 46.1 CM/SEC
BH CV ECHO MEAS - MV DEC TIME: 0.23 SEC
BH CV ECHO MEAS - MV E MAX VEL: 73.7 CM/SEC
BH CV ECHO MEAS - MV E/A: 1.6
BH CV ECHO MEAS - SI(AO): 87.1 ML/M^2
BH CV ECHO MEAS - SI(CUBED): 43.4 ML/M^2
BH CV ECHO MEAS - SI(LVOT): 43.5 ML/M^2
BH CV ECHO MEAS - SI(MOD-SP4): 26.2 ML/M^2
BH CV ECHO MEAS - SI(TEICH): 38.4 ML/M^2
BH CV ECHO MEAS - SV(AO): 165.7 ML
BH CV ECHO MEAS - SV(CUBED): 82.5 ML
BH CV ECHO MEAS - SV(LVOT): 82.6 ML
BH CV ECHO MEAS - SV(MOD-SP4): 49.8 ML
BH CV ECHO MEAS - SV(TEICH): 73 ML
BH CV ECHO MEASUREMENTS AVERAGE E/E' RATIO: 5.4
LEFT ATRIUM VOLUME INDEX: 22.8 ML/M2
LEFT ATRIUM VOLUME: 43.4 CM3
MAXIMAL PREDICTED HEART RATE: 189 BPM
STRESS TARGET HR: 161 BPM

## 2020-06-23 PROCEDURE — 93306 TTE W/DOPPLER COMPLETE: CPT

## 2020-06-23 PROCEDURE — 0296T HC EXT ECG > 48HR TO 21 DAY RCRD W/CONECT INTL RCRD: CPT

## 2020-06-23 PROCEDURE — 93306 TTE W/DOPPLER COMPLETE: CPT | Performed by: INTERNAL MEDICINE

## 2020-06-24 ENCOUNTER — TELEPHONE (OUTPATIENT)
Dept: EMERGENCY DEPT | Facility: HOSPITAL | Age: 30
End: 2020-06-24

## 2020-06-29 ENCOUNTER — TELEPHONE (OUTPATIENT)
Dept: INTERNAL MEDICINE | Facility: CLINIC | Age: 30
End: 2020-06-29

## 2020-06-29 NOTE — TELEPHONE ENCOUNTER
----- Message from ALBERTA Stevens MD sent at 6/29/2020 10:30 AM CDT -----  Can you call the patient and notify them of normal echo results? Thanks

## 2020-07-11 PROCEDURE — 0298T PR EXT ECG > 48HR TO 21 DAY REVIEW AND INTERPRETATN: CPT | Performed by: INTERNAL MEDICINE

## 2020-07-13 ENCOUNTER — TELEPHONE (OUTPATIENT)
Dept: EMERGENCY DEPT | Facility: HOSPITAL | Age: 30
End: 2020-07-13

## 2021-01-11 ENCOUNTER — TELEPHONE (OUTPATIENT)
Dept: INTERNAL MEDICINE | Facility: CLINIC | Age: 31
End: 2021-01-11

## 2021-01-15 ENCOUNTER — LAB (OUTPATIENT)
Dept: LAB | Facility: HOSPITAL | Age: 31
End: 2021-01-15

## 2021-01-15 ENCOUNTER — OFFICE VISIT (OUTPATIENT)
Dept: INTERNAL MEDICINE | Facility: CLINIC | Age: 31
End: 2021-01-15

## 2021-01-15 VITALS
TEMPERATURE: 98.8 F | SYSTOLIC BLOOD PRESSURE: 120 MMHG | HEART RATE: 96 BPM | OXYGEN SATURATION: 96 % | DIASTOLIC BLOOD PRESSURE: 70 MMHG | RESPIRATION RATE: 16 BRPM | HEIGHT: 72 IN | WEIGHT: 147.8 LBS | BODY MASS INDEX: 20.02 KG/M2

## 2021-01-15 DIAGNOSIS — Z00.00 HEALTHCARE MAINTENANCE: Primary | ICD-10-CM

## 2021-01-15 DIAGNOSIS — Z00.00 HEALTHCARE MAINTENANCE: ICD-10-CM

## 2021-01-15 DIAGNOSIS — Z11.59 ENCOUNTER FOR HEPATITIS C SCREENING TEST FOR LOW RISK PATIENT: ICD-10-CM

## 2021-01-15 DIAGNOSIS — Z00.00 ENCOUNTER FOR PREVENTIVE CARE: ICD-10-CM

## 2021-01-15 LAB
CHOLEST SERPL-MCNC: 91 MG/DL (ref 0–200)
HBA1C MFR BLD: 5 % (ref 4.8–5.6)
HCV AB SER DONR QL: NORMAL
HDLC SERPL-MCNC: 33 MG/DL (ref 40–60)
LDLC SERPL CALC-MCNC: 46 MG/DL (ref 0–100)
LDLC/HDLC SERPL: 1.49 {RATIO}
TRIGL SERPL-MCNC: 44 MG/DL (ref 0–150)
VLDLC SERPL-MCNC: 12 MG/DL (ref 5–40)

## 2021-01-15 PROCEDURE — 36415 COLL VENOUS BLD VENIPUNCTURE: CPT

## 2021-01-15 PROCEDURE — 99213 OFFICE O/P EST LOW 20 MIN: CPT | Performed by: INTERNAL MEDICINE

## 2021-01-15 PROCEDURE — 86803 HEPATITIS C AB TEST: CPT

## 2021-01-15 PROCEDURE — 83036 HEMOGLOBIN GLYCOSYLATED A1C: CPT

## 2021-01-15 PROCEDURE — 80061 LIPID PANEL: CPT

## 2021-01-15 RX ORDER — DIPHENOXYLATE HYDROCHLORIDE AND ATROPINE SULFATE 2.5; .025 MG/1; MG/1
1 TABLET ORAL DAILY
COMMUNITY

## 2021-01-15 NOTE — PROGRESS NOTES
Chief Complaint   Patient presents with   • Office Visit   • Labs         History:  Manuel Hutchinson is a 30 y.o. male who presents today for evaluation of the above problems.      His significant other recently had a miscarriage.  He wanted to be checked for possible genetic disorders.  He is unsure if her gynecologist wanted her to be tested or him.    He now has insurance and would like to get his routine labs such as cholesterol and diabetes checked.  They have had a few miscarriages together but they do have a daughter together as well.      The patient denies any past medical history such as genetic disorders like sickle cell    HPI    ROS:  Review of Systems   Constitutional: Negative for fatigue and fever.   Respiratory: Negative for shortness of breath.    Cardiovascular: Negative for chest pain.         Current Outpatient Medications:   •  multivitamin (MULTI VITAMIN DAILY PO), Take 1 tablet by mouth Daily., Disp: , Rfl:     Lab Results   Component Value Date    GLUCOSE 122 (H) 06/06/2020    BUN 12 06/06/2020    CREATININE 0.81 06/06/2020    EGFRIFNONA 111 06/06/2020    EGFRIFAFRI 135 06/06/2020    BCR 14.8 06/06/2020    K 3.6 06/06/2020    CO2 24.0 06/06/2020    CALCIUM 10.0 06/06/2020    ALBUMIN 5.00 06/06/2020    AST 35 06/06/2020    ALT 13 06/06/2020       WBC   Date Value Ref Range Status   06/06/2020 6.81 3.40 - 10.80 10*3/mm3 Final     RBC   Date Value Ref Range Status   06/06/2020 4.96 4.14 - 5.80 10*6/mm3 Final     Hemoglobin   Date Value Ref Range Status   06/06/2020 14.3 13.0 - 17.7 g/dL Final     Hematocrit   Date Value Ref Range Status   06/06/2020 42.1 37.5 - 51.0 % Final     MCV   Date Value Ref Range Status   06/06/2020 84.9 79.0 - 97.0 fL Final     MCH   Date Value Ref Range Status   06/06/2020 28.8 26.6 - 33.0 pg Final     MCHC   Date Value Ref Range Status   06/06/2020 34.0 31.5 - 35.7 g/dL Final     RDW   Date Value Ref Range Status   06/06/2020 12.9 12.3 - 15.4 % Final     RDW-SD  "  Date Value Ref Range Status   06/06/2020 39.1 37.0 - 54.0 fl Final     MPV   Date Value Ref Range Status   06/06/2020 10.5 6.0 - 12.0 fL Final     Platelets   Date Value Ref Range Status   06/06/2020 254 140 - 450 10*3/mm3 Final     Neutrophil %   Date Value Ref Range Status   06/06/2020 65.9 42.7 - 76.0 % Final     Lymphocyte %   Date Value Ref Range Status   06/06/2020 24.8 19.6 - 45.3 % Final     Monocyte %   Date Value Ref Range Status   06/06/2020 7.0 5.0 - 12.0 % Final     Eosinophil %   Date Value Ref Range Status   06/06/2020 1.3 0.3 - 6.2 % Final     Basophil %   Date Value Ref Range Status   06/06/2020 0.7 0.0 - 1.5 % Final     Immature Grans %   Date Value Ref Range Status   06/06/2020 0.3 0.0 - 0.5 % Final     Neutrophils, Absolute   Date Value Ref Range Status   06/06/2020 4.48 1.70 - 7.00 10*3/mm3 Final     Lymphocytes, Absolute   Date Value Ref Range Status   06/06/2020 1.69 0.70 - 3.10 10*3/mm3 Final     Monocytes, Absolute   Date Value Ref Range Status   06/06/2020 0.48 0.10 - 0.90 10*3/mm3 Final     Eosinophils, Absolute   Date Value Ref Range Status   06/06/2020 0.09 0.00 - 0.40 10*3/mm3 Final     Basophils, Absolute   Date Value Ref Range Status   06/06/2020 0.05 0.00 - 0.20 10*3/mm3 Final     Immature Grans, Absolute   Date Value Ref Range Status   06/06/2020 0.02 0.00 - 0.05 10*3/mm3 Final     nRBC   Date Value Ref Range Status   06/06/2020 0.0 0.0 - 0.2 /100 WBC Final         OBJECTIVE:  Visit Vitals  /70 (BP Location: Left arm, Patient Position: Sitting, Cuff Size: Adult)   Pulse 96   Temp 98.8 °F (37.1 °C) (Oral)   Resp 16   Ht 182.9 cm (72.01\")   Wt 67 kg (147 lb 12.8 oz)   SpO2 96%   BMI 20.04 kg/m²      Physical Exam  Constitutional:       General: He is not in acute distress.     Appearance: Normal appearance. He is well-developed.      Comments: Pleasant   HENT:      Head: Normocephalic and atraumatic.   Eyes:      Pupils: Pupils are equal, round, and reactive to light.   Neck: "      Thyroid: No thyromegaly.      Trachea: Phonation normal.   Pulmonary:      Effort: No respiratory distress.   Skin:     Coloration: Skin is not pale.      Findings: No erythema.   Neurological:      Mental Status: He is alert.   Psychiatric:         Behavior: Behavior normal.         Thought Content: Thought content normal.         Judgment: Judgment normal.         Assessment/Plan    Diagnoses and all orders for this visit:    1. Healthcare maintenance (Primary)  -     Lipid Panel; Future  -     Hemoglobin A1c; Future      Reviewed his labs from 6 months ago.  He does not need repeat of these labs but will check lipid panel and A1c.  He is going to call his significant other's gynecologist and see which genetic testing he was referring to.  The patient has no no family history of sickle cell or other genetic disorders, but we could run electrophoresis if this is indicated.    Return in about 1 year (around 1/15/2022) for Annual physical, cancel 6/16/21.    Patient was given instructions and counseling regarding his/her condition or for health maintenance advice. Please see specific information pulled into the AVS if appropriate.     SIMONA Stevens MD   10:06 CST  1/15/2021

## 2021-01-29 ENCOUNTER — TELEPHONE (OUTPATIENT)
Dept: INTERNAL MEDICINE | Facility: CLINIC | Age: 31
End: 2021-01-29

## 2021-01-29 NOTE — TELEPHONE ENCOUNTER
Nilson De Jesus MA   1/29/2021  2:11 PM CST      ATTEMPTED TO CALL PATIENT, VM HAS BEEN LEFT FOR RETURN CALL.    ALBERTA Stevens MD   1/18/2021  7:52 AM CST      Can you call patient to notify them of normal labs? Thanks         Pt returned call. Gave results. He v/u

## 2021-12-07 ENCOUNTER — LAB (OUTPATIENT)
Dept: LAB | Facility: HOSPITAL | Age: 31
End: 2021-12-07

## 2021-12-07 ENCOUNTER — OFFICE VISIT (OUTPATIENT)
Dept: INTERNAL MEDICINE | Facility: CLINIC | Age: 31
End: 2021-12-07

## 2021-12-07 VITALS
HEIGHT: 72 IN | BODY MASS INDEX: 19.37 KG/M2 | WEIGHT: 143 LBS | DIASTOLIC BLOOD PRESSURE: 70 MMHG | RESPIRATION RATE: 16 BRPM | HEART RATE: 72 BPM | OXYGEN SATURATION: 98 % | SYSTOLIC BLOOD PRESSURE: 120 MMHG | TEMPERATURE: 97.8 F

## 2021-12-07 DIAGNOSIS — Z11.3 SCREEN FOR STD (SEXUALLY TRANSMITTED DISEASE): Primary | ICD-10-CM

## 2021-12-07 DIAGNOSIS — Z11.3 SCREEN FOR STD (SEXUALLY TRANSMITTED DISEASE): ICD-10-CM

## 2021-12-07 LAB
C TRACH RRNA CVX QL NAA+PROBE: NOT DETECTED
HIV1+2 AB SER QL: NORMAL
N GONORRHOEA RRNA SPEC QL NAA+PROBE: NOT DETECTED

## 2021-12-07 PROCEDURE — 86695 HERPES SIMPLEX TYPE 1 TEST: CPT

## 2021-12-07 PROCEDURE — 87491 CHLMYD TRACH DNA AMP PROBE: CPT

## 2021-12-07 PROCEDURE — 86696 HERPES SIMPLEX TYPE 2 TEST: CPT

## 2021-12-07 PROCEDURE — 36415 COLL VENOUS BLD VENIPUNCTURE: CPT

## 2021-12-07 PROCEDURE — 87591 N.GONORRHOEAE DNA AMP PROB: CPT

## 2021-12-07 PROCEDURE — 99213 OFFICE O/P EST LOW 20 MIN: CPT | Performed by: INTERNAL MEDICINE

## 2021-12-07 PROCEDURE — 86592 SYPHILIS TEST NON-TREP QUAL: CPT

## 2021-12-07 PROCEDURE — G0432 EIA HIV-1/HIV-2 SCREEN: HCPCS

## 2021-12-07 NOTE — PROGRESS NOTES
Chief Complaint   Patient presents with   • Office Visit   • Checked for STDs         History:  Manuel Hutchinson is a 31 y.o. male who presents today for evaluation of the above problems.      He presents today to get tested for sexually transmitted infections.  He has no symptoms, but would like to be tested.  He is in a monogamous relationship with 1 female partner.  They do not currently use protection.    He typically gets this testing at the health department, but now has a physician and would like to get it done here.      HPI      ROS:  Review of Systems  As above      Current Outpatient Medications:   •  multivitamin (MULTI VITAMIN DAILY PO), Take 1 tablet by mouth Daily., Disp: , Rfl:     Lab Results   Component Value Date    GLUCOSE 122 (H) 06/06/2020    BUN 12 06/06/2020    CREATININE 0.81 06/06/2020    EGFRIFNONA 111 06/06/2020    EGFRIFAFRI 135 06/06/2020    BCR 14.8 06/06/2020    K 3.6 06/06/2020    CO2 24.0 06/06/2020    CALCIUM 10.0 06/06/2020    ALBUMIN 5.00 06/06/2020    AST 35 06/06/2020    ALT 13 06/06/2020       WBC   Date Value Ref Range Status   06/06/2020 6.81 3.40 - 10.80 10*3/mm3 Final     RBC   Date Value Ref Range Status   06/06/2020 4.96 4.14 - 5.80 10*6/mm3 Final     Hemoglobin   Date Value Ref Range Status   06/06/2020 14.3 13.0 - 17.7 g/dL Final     Hematocrit   Date Value Ref Range Status   06/06/2020 42.1 37.5 - 51.0 % Final     MCV   Date Value Ref Range Status   06/06/2020 84.9 79.0 - 97.0 fL Final     MCH   Date Value Ref Range Status   06/06/2020 28.8 26.6 - 33.0 pg Final     MCHC   Date Value Ref Range Status   06/06/2020 34.0 31.5 - 35.7 g/dL Final     RDW   Date Value Ref Range Status   06/06/2020 12.9 12.3 - 15.4 % Final     RDW-SD   Date Value Ref Range Status   06/06/2020 39.1 37.0 - 54.0 fl Final     MPV   Date Value Ref Range Status   06/06/2020 10.5 6.0 - 12.0 fL Final     Platelets   Date Value Ref Range Status   06/06/2020 254 140 - 450 10*3/mm3 Final     Neutrophil  "%   Date Value Ref Range Status   06/06/2020 65.9 42.7 - 76.0 % Final     Lymphocyte %   Date Value Ref Range Status   06/06/2020 24.8 19.6 - 45.3 % Final     Monocyte %   Date Value Ref Range Status   06/06/2020 7.0 5.0 - 12.0 % Final     Eosinophil %   Date Value Ref Range Status   06/06/2020 1.3 0.3 - 6.2 % Final     Basophil %   Date Value Ref Range Status   06/06/2020 0.7 0.0 - 1.5 % Final     Immature Grans %   Date Value Ref Range Status   06/06/2020 0.3 0.0 - 0.5 % Final     Neutrophils, Absolute   Date Value Ref Range Status   06/06/2020 4.48 1.70 - 7.00 10*3/mm3 Final     Lymphocytes, Absolute   Date Value Ref Range Status   06/06/2020 1.69 0.70 - 3.10 10*3/mm3 Final     Monocytes, Absolute   Date Value Ref Range Status   06/06/2020 0.48 0.10 - 0.90 10*3/mm3 Final     Eosinophils, Absolute   Date Value Ref Range Status   06/06/2020 0.09 0.00 - 0.40 10*3/mm3 Final     Basophils, Absolute   Date Value Ref Range Status   06/06/2020 0.05 0.00 - 0.20 10*3/mm3 Final     Immature Grans, Absolute   Date Value Ref Range Status   06/06/2020 0.02 0.00 - 0.05 10*3/mm3 Final     nRBC   Date Value Ref Range Status   06/06/2020 0.0 0.0 - 0.2 /100 WBC Final         OBJECTIVE:  Visit Vitals  /70 (BP Location: Left arm, Patient Position: Sitting, Cuff Size: Adult)   Pulse 72   Temp 97.8 °F (36.6 °C) (Oral)   Resp 16   Ht 182.9 cm (72.01\")   Wt 64.9 kg (143 lb)   SpO2 98%   BMI 19.39 kg/m²      Physical Exam  Vitals reviewed.   Constitutional:       General: He is not in acute distress.     Appearance: Normal appearance. He is well-developed. He is not ill-appearing or toxic-appearing.      Comments: Pleasant   HENT:      Head: Normocephalic and atraumatic.   Eyes:      Pupils: Pupils are equal, round, and reactive to light.   Neck:      Thyroid: No thyromegaly.      Trachea: Phonation normal.   Pulmonary:      Effort: No respiratory distress.   Skin:     Coloration: Skin is not pale.      Findings: No erythema. "   Neurological:      Mental Status: He is alert.   Psychiatric:         Behavior: Behavior normal.         Thought Content: Thought content normal.         Judgment: Judgment normal.         Assessment/Plan    Diagnoses and all orders for this visit:    1. Screen for STD (sexually transmitted disease) (Primary)  -     Chlamydia trachomatis, Neisseria gonorrhoeae, PCR - Urine, Urine, Clean Catch; Future  -     HIV-1/O/2 ANTIGEN/ANTIBODY, 4TH GENERATION; Future  -     HSV 1 and 2-Specific Ab, IgG; Future  -     RPR; Future      Will check the above urine and blood work.  He has no symptoms.  Further work-up or management based on those results.    Keep appointment in January for his annual physical.  Follow-up sooner if needed.    Return for Next scheduled follow up.      SIMONA Stevens MD  09:19 CST  12/7/2021

## 2021-12-08 LAB
HSV1 IGG SER IA-ACNC: <0.91 INDEX (ref 0–0.9)
HSV2 IGG SER IA-ACNC: 7.07 INDEX (ref 0–0.9)

## 2021-12-09 ENCOUNTER — TELEPHONE (OUTPATIENT)
Dept: INTERNAL MEDICINE | Facility: CLINIC | Age: 31
End: 2021-12-09

## 2021-12-09 LAB — RPR SER QL: NORMAL

## 2021-12-09 NOTE — PROGRESS NOTES
Spoke with patient regarding results. He wanted to know if he has HSV 2 and what he needs to do to prevent from having this again and what it means to be positive.

## 2021-12-09 NOTE — TELEPHONE ENCOUNTER
"PATIENT HAS CALLED, GIVEN MESSAGE BUT IS ASKING WHAT IT MEANS TO HAVE ANTIBODIES AND WHAT DOES IT MEAN THAT HE HAD IT BUT IT MAY REACTIVATE IN THE FUTURE\"? IS IT DORMENT?      HE IS ALSO ASKING HOW LONG THE ANTIBODIES LAST?    HE IS ASKING IF ITS POSSIBLE TO HAVE A FALSE POSITIVE RESULT.    "

## 2021-12-10 NOTE — TELEPHONE ENCOUNTER
See results note. The answers to his questions should be there. Arleen attempted to call yesterday but there was no answer. Thanks

## 2022-02-01 ENCOUNTER — OFFICE VISIT (OUTPATIENT)
Dept: INTERNAL MEDICINE | Facility: CLINIC | Age: 32
End: 2022-02-01

## 2022-02-01 VITALS
RESPIRATION RATE: 16 BRPM | HEIGHT: 72 IN | DIASTOLIC BLOOD PRESSURE: 60 MMHG | SYSTOLIC BLOOD PRESSURE: 100 MMHG | OXYGEN SATURATION: 98 % | BODY MASS INDEX: 20.59 KG/M2 | WEIGHT: 152 LBS | HEART RATE: 88 BPM | TEMPERATURE: 98.2 F

## 2022-02-01 DIAGNOSIS — Z13.31 DEPRESSION SCREEN: ICD-10-CM

## 2022-02-01 DIAGNOSIS — Z71.85 VACCINE COUNSELING: ICD-10-CM

## 2022-02-01 DIAGNOSIS — Z00.00 ENCOUNTER FOR PREVENTIVE CARE: Primary | ICD-10-CM

## 2022-02-01 DIAGNOSIS — Z13.6 HYPERTENSION SCREEN: ICD-10-CM

## 2022-02-01 PROCEDURE — 3008F BODY MASS INDEX DOCD: CPT | Performed by: INTERNAL MEDICINE

## 2022-02-01 PROCEDURE — 99395 PREV VISIT EST AGE 18-39: CPT | Performed by: INTERNAL MEDICINE

## 2022-02-01 PROCEDURE — 2014F MENTAL STATUS ASSESS: CPT | Performed by: INTERNAL MEDICINE

## 2022-02-01 NOTE — PROGRESS NOTES
Chief Complaint   Patient presents with   • Annual Exam         History:  Manuel Hutchinson is a 31 y.o. male who presents today for evaluation of the above problems.      He presents today for an annual physical.  Overall, he has been doing well without any new issues or complaints.    He denies any new family history, surgical history, past medical history, allergies, or social history.    He is not up-to-date on the typical symptoms at this time.    He denies any tobacco use, recreational drug use, or alcohol use.    He is sexually active with one female partner    He does not exercise specifically, but does walk a lot at work.  His diet is very good    He has not had COVID-19 and is not interested in the vaccine.    He reports his dad had either stomach or colon cancer at the age of 57.  He is not sure which type exactly, but will find out and let us know.    HPI    Social History     Socioeconomic History   • Marital status: Single   Tobacco Use   • Smoking status: Former Smoker     Packs/day: 1.00     Years: 7.00     Pack years: 7.00     Types: Cigarettes   • Smokeless tobacco: Never Used   • Tobacco comment: 4 years ago   Substance and Sexual Activity   • Alcohol use: Yes     Comment: occ   • Drug use: Never   • Sexual activity: Defer       PHQ-9 Depression Screening  Little interest or pleasure in doing things? 0   Feeling down, depressed, or hopeless? 0   Trouble falling or staying asleep, or sleeping too much?     Feeling tired or having little energy?     Poor appetite or overeating?     Feeling bad about yourself - or that you are a failure or have let yourself or your family down?     Trouble concentrating on things, such as reading the newspaper or watching television?     Moving or speaking so slowly that other people could have noticed? Or the opposite - being so fidgety or restless that you have been moving around a lot more than usual?     Thoughts that you would be better off dead, or of hurting  yourself in some way?     PHQ-9 Total Score 0   If you checked off any problems, how difficult have these problems made it for you to do your work, take care of things at home, or get along with other people?       PHQ-9 Total Score: 0    ROS:  Review of Systems   Constitutional: Negative for activity change, appetite change, fatigue, fever and unexpected weight change.   HENT: Negative.  Negative for sore throat and trouble swallowing.    Eyes: Negative for pain and visual disturbance.   Respiratory: Negative for cough, chest tightness and shortness of breath.    Cardiovascular: Negative for chest pain, palpitations and leg swelling.   Gastrointestinal: Negative for abdominal pain, constipation, diarrhea, nausea and vomiting.   Endocrine: Negative for cold intolerance and heat intolerance.   Genitourinary: Negative.    Musculoskeletal: Negative.  Negative for back pain, neck pain and neck stiffness.   Skin: Negative for rash and wound.   Neurological: Negative for dizziness, syncope, weakness, light-headedness and headaches.   Hematological: Negative for adenopathy. Does not bruise/bleed easily.   Psychiatric/Behavioral: Negative for agitation, behavioral problems and confusion.         Current Outpatient Medications:   •  multivitamin (MULTI VITAMIN DAILY PO), Take 1 tablet by mouth Daily., Disp: , Rfl:     Lab Results   Component Value Date    GLUCOSE 122 (H) 06/06/2020    BUN 12 06/06/2020    CREATININE 0.81 06/06/2020    EGFRIFNONA 111 06/06/2020    EGFRIFAFRI 135 06/06/2020    BCR 14.8 06/06/2020    K 3.6 06/06/2020    CO2 24.0 06/06/2020    CALCIUM 10.0 06/06/2020    ALBUMIN 5.00 06/06/2020    AST 35 06/06/2020    ALT 13 06/06/2020       WBC   Date Value Ref Range Status   06/06/2020 6.81 3.40 - 10.80 10*3/mm3 Final     RBC   Date Value Ref Range Status   06/06/2020 4.96 4.14 - 5.80 10*6/mm3 Final     Hemoglobin   Date Value Ref Range Status   06/06/2020 14.3 13.0 - 17.7 g/dL Final     Hematocrit   Date Value  Ref Range Status   06/06/2020 42.1 37.5 - 51.0 % Final     MCV   Date Value Ref Range Status   06/06/2020 84.9 79.0 - 97.0 fL Final     MCH   Date Value Ref Range Status   06/06/2020 28.8 26.6 - 33.0 pg Final     MCHC   Date Value Ref Range Status   06/06/2020 34.0 31.5 - 35.7 g/dL Final     RDW   Date Value Ref Range Status   06/06/2020 12.9 12.3 - 15.4 % Final     RDW-SD   Date Value Ref Range Status   06/06/2020 39.1 37.0 - 54.0 fl Final     MPV   Date Value Ref Range Status   06/06/2020 10.5 6.0 - 12.0 fL Final     Platelets   Date Value Ref Range Status   06/06/2020 254 140 - 450 10*3/mm3 Final     Neutrophil %   Date Value Ref Range Status   06/06/2020 65.9 42.7 - 76.0 % Final     Lymphocyte %   Date Value Ref Range Status   06/06/2020 24.8 19.6 - 45.3 % Final     Monocyte %   Date Value Ref Range Status   06/06/2020 7.0 5.0 - 12.0 % Final     Eosinophil %   Date Value Ref Range Status   06/06/2020 1.3 0.3 - 6.2 % Final     Basophil %   Date Value Ref Range Status   06/06/2020 0.7 0.0 - 1.5 % Final     Immature Grans %   Date Value Ref Range Status   06/06/2020 0.3 0.0 - 0.5 % Final     Neutrophils, Absolute   Date Value Ref Range Status   06/06/2020 4.48 1.70 - 7.00 10*3/mm3 Final     Lymphocytes, Absolute   Date Value Ref Range Status   06/06/2020 1.69 0.70 - 3.10 10*3/mm3 Final     Monocytes, Absolute   Date Value Ref Range Status   06/06/2020 0.48 0.10 - 0.90 10*3/mm3 Final     Eosinophils, Absolute   Date Value Ref Range Status   06/06/2020 0.09 0.00 - 0.40 10*3/mm3 Final     Basophils, Absolute   Date Value Ref Range Status   06/06/2020 0.05 0.00 - 0.20 10*3/mm3 Final     Immature Grans, Absolute   Date Value Ref Range Status   06/06/2020 0.02 0.00 - 0.05 10*3/mm3 Final     nRBC   Date Value Ref Range Status   06/06/2020 0.0 0.0 - 0.2 /100 WBC Final         OBJECTIVE:  Visit Vitals  /60 (BP Location: Left arm, Patient Position: Sitting, Cuff Size: Adult)   Pulse 88   Temp 98.2 °F (36.8 °C) (Oral)  "  Resp 16   Ht 182.9 cm (72.01\")   Wt 68.9 kg (152 lb)   SpO2 98%   BMI 20.61 kg/m²      Physical Exam  Vitals and nursing note reviewed.   Constitutional:       General: He is not in acute distress.     Appearance: Normal appearance. He is well-developed.      Comments: Pleasant     HENT:      Head: Normocephalic and atraumatic.      Right Ear: Tympanic membrane, ear canal and external ear normal. There is no impacted cerumen.      Left Ear: Tympanic membrane, ear canal and external ear normal. There is no impacted cerumen.      Mouth/Throat:      Pharynx: No oropharyngeal exudate.   Eyes:      General: No scleral icterus.     Conjunctiva/sclera: Conjunctivae normal.      Pupils: Pupils are equal, round, and reactive to light.   Neck:      Thyroid: No thyromegaly.      Vascular: No JVD.   Cardiovascular:      Rate and Rhythm: Normal rate and regular rhythm.      Heart sounds: Normal heart sounds. No murmur heard.  No friction rub. No gallop.    Pulmonary:      Effort: Pulmonary effort is normal. No respiratory distress.      Breath sounds: Normal breath sounds. No stridor. No wheezing, rhonchi or rales.   Abdominal:      General: Bowel sounds are normal. There is no distension.      Palpations: Abdomen is soft.      Tenderness: There is no abdominal tenderness.   Musculoskeletal:      Right lower leg: No edema.      Left lower leg: No edema.   Skin:     General: Skin is warm and dry.      Coloration: Skin is not jaundiced.   Neurological:      General: No focal deficit present.      Mental Status: He is alert. Mental status is at baseline.      Cranial Nerves: No cranial nerve deficit.      Deep Tendon Reflexes:      Reflex Scores:       Patellar reflexes are 2+ on the right side and 2+ on the left side.  Psychiatric:         Mood and Affect: Mood normal.         Behavior: Behavior normal.         Thought Content: Thought content normal.         Judgment: Judgment normal.         Assessment/Plan    Diagnoses and " all orders for this visit:    1. Encounter for preventive care (Primary)    2. Depression screen    3. Hypertension screen    4. Vaccine counseling      He is doing very well.  Reviewed his blood work from last year.  I do not think we need to repeat any labs today as they all looked very good.  Depression screen was negative PHQ 2 of 0.  Hypertension screen was negative with a blood pressure 100/60.  Counseled on the COVID-19 vaccine, but he declines.    Discussed that if he is still interested we can obtain yearly STI labs at his annual physicals.    Follow-up in 1 year for annual physical, or sooner if indicated.    Counseling/Anticipatory Guidance Discussed: nutrition, physical activity, healthy weight, misuse of tobacco, alcohol and drugs, dental health, mental health and immunizations    Patient's Body mass index is 20.61 kg/m². indicating that he is within normal range (BMI 18.5-24.9). No BMI management plan needed..      Return in about 1 year (around 2/1/2023) for Annual physical.    Patient was given instructions and counseling regarding his/her condition or for health maintenance advice. Please see specific information pulled into the AVS if appropriate.     SIMONA Stevens MD  09:53 CST  2/1/2022

## 2022-02-01 NOTE — PATIENT INSTRUCTIONS
Preventive Care 21-39 Years Old, Male  Preventive care refers to lifestyle choices and visits with your health care provider that can promote health and wellness. This includes:  · A yearly physical exam. This is also called an annual wellness visit.  · Regular dental and eye exams.  · Immunizations.  · Screening for certain conditions.  · Healthy lifestyle choices, such as:  ? Eating a healthy diet.  ? Getting regular exercise.  ? Not using drugs or products that contain nicotine and tobacco.  ? Limiting alcohol use.  What can I expect for my preventive care visit?  Physical exam  Your health care provider may check your:  · Height and weight. These may be used to calculate your BMI (body mass index). BMI is a measurement that tells if you are at a healthy weight.  · Heart rate and blood pressure.  · Body temperature.  · Skin for abnormal spots.  Counseling  Your health care provider may ask you questions about your:  · Past medical problems.  · Family's medical history.  · Alcohol, tobacco, and drug use.  · Emotional well-being.  · Home life and relationship well-being.  · Sexual activity.  · Diet, exercise, and sleep habits.  · Work and work environment.  · Access to firearms.  What immunizations do I need?    Vaccines are usually given at various ages, according to a schedule. Your health care provider will recommend vaccines for you based on your age, medical history, and lifestyle or other factors, such as travel or where you work.  What tests do I need?  Blood tests  · Lipid and cholesterol levels. These may be checked every 5 years starting at age 20.  · Hepatitis C test.  · Hepatitis B test.  Screening    · Diabetes screening. This is done by checking your blood sugar (glucose) after you have not eaten for a while (fasting).  · Genital exam to check for testicular cancer or hernias.  · STD (sexually transmitted disease) testing, if you are at risk.  Talk with your health care provider about your test  results, treatment options, and if necessary, the need for more tests.  Follow these instructions at home:  Eating and drinking    · Eat a healthy diet that includes fresh fruits and vegetables, whole grains, lean protein, and low-fat dairy products.  · Drink enough fluid to keep your urine pale yellow.  · Take vitamin and mineral supplements as recommended by your health care provider.  · Do not drink alcohol if your health care provider tells you not to drink.  · If you drink alcohol:  ? Limit how much you have to 0-2 drinks a day.  ? Be aware of how much alcohol is in your drink. In the [place] one drink equals one 12 oz bottle of beer (355 mL), one 5 oz glass of wine (148 mL), or one 1½ oz glass of hard liquor (44 mL).    Lifestyle  · Take daily care of your teeth and gums. Brush your teeth every morning and night with fluoride toothpaste. Floss one time each day.  · Stay active. Exercise for at least 30 minutes 5 or more days each week.  · Do not use any products that contain nicotine or tobacco, such as cigarettes, e-cigarettes, and chewing tobacco. If you need help quitting, ask your health care provider.  · Do not use drugs.  · If you are sexually active, practice safe sex. Use a condom or other form of protection to prevent STIs (sexually transmitted infections).  · Find healthy ways to cope with stress, such as:  ? Meditation, yoga, or listening to music.  ? Journaling.  ? Talking to a trusted person.  ? Spending time with friends and family.  Safety  · Always wear your seat belt while driving or riding in a vehicle.  · Do not drive:  ? If you have been drinking alcohol. Do not ride with someone who has been drinking.  ? When you are tired or distracted.  ? While texting.  · Wear a helmet and other protective equipment during sports activities.  · If you have firearms in your house, make sure you follow all gun safety procedures.  · Seek help if you have been physically or sexually abused.  What's  next?  · Go to your health care provider once a year for an annual wellness visit.  · Ask your health care provider how often you should have your eyes and teeth checked.  · Stay up to date on all vaccines.  This information is not intended to replace advice given to you by your health care provider. Make sure you discuss any questions you have with your health care provider.  Document Revised: 09/02/2020 Document Reviewed: 12/12/2019  Elsevier Patient Education © 2021 [employer]

## 2022-04-26 ENCOUNTER — OFFICE VISIT (OUTPATIENT)
Dept: INTERNAL MEDICINE | Facility: CLINIC | Age: 32
End: 2022-04-26

## 2022-04-26 VITALS
OXYGEN SATURATION: 98 % | DIASTOLIC BLOOD PRESSURE: 80 MMHG | BODY MASS INDEX: 21.67 KG/M2 | TEMPERATURE: 97.8 F | HEIGHT: 72 IN | HEART RATE: 71 BPM | RESPIRATION RATE: 16 BRPM | SYSTOLIC BLOOD PRESSURE: 120 MMHG | WEIGHT: 160 LBS

## 2022-04-26 DIAGNOSIS — L30.9 DERMATITIS: Primary | ICD-10-CM

## 2022-04-26 PROCEDURE — 99212 OFFICE O/P EST SF 10 MIN: CPT | Performed by: INTERNAL MEDICINE

## 2022-04-26 NOTE — PROGRESS NOTES
Chief Complaint   Patient presents with   • Office Visit   • Eczema         History:  Manuel Hutchinson is a 31 y.o. male who presents today for evaluation of the above problems.    Presents for acute visit for pruritus of his upper and lower back and upper arms, sometimes his legs.  Denies any changes in soaps or detergents.  This has been ongoing for the last 2 weeks and despite Aveeno and coconut oil has continued.  He does not have a rash, but feels the skin may be dry.  There is excoriation where he has scratched himself.  Denies any new medications or other changes.    HPI      ROS:  Review of Systems  As above    Current Outpatient Medications:   •  hydrocortisone 1 % cream, Apply 1 application topically to the appropriate area as directed 2 (Two) Times a Day for 14 days., Disp: 120 g, Rfl: 0  •  multivitamin (MULTI VITAMIN DAILY PO), Take 1 tablet by mouth Daily., Disp: , Rfl:     Lab Results   Component Value Date    GLUCOSE 122 (H) 06/06/2020    BUN 12 06/06/2020    CREATININE 0.81 06/06/2020    EGFRIFNONA 111 06/06/2020    EGFRIFAFRI 135 06/06/2020    BCR 14.8 06/06/2020    K 3.6 06/06/2020    CO2 24.0 06/06/2020    CALCIUM 10.0 06/06/2020    ALBUMIN 5.00 06/06/2020    AST 35 06/06/2020    ALT 13 06/06/2020       WBC   Date Value Ref Range Status   06/06/2020 6.81 3.40 - 10.80 10*3/mm3 Final     RBC   Date Value Ref Range Status   06/06/2020 4.96 4.14 - 5.80 10*6/mm3 Final     Hemoglobin   Date Value Ref Range Status   06/06/2020 14.3 13.0 - 17.7 g/dL Final     Hematocrit   Date Value Ref Range Status   06/06/2020 42.1 37.5 - 51.0 % Final     MCV   Date Value Ref Range Status   06/06/2020 84.9 79.0 - 97.0 fL Final     MCH   Date Value Ref Range Status   06/06/2020 28.8 26.6 - 33.0 pg Final     MCHC   Date Value Ref Range Status   06/06/2020 34.0 31.5 - 35.7 g/dL Final     RDW   Date Value Ref Range Status   06/06/2020 12.9 12.3 - 15.4 % Final     RDW-SD   Date Value Ref Range Status   06/06/2020 39.1 37.0  "- 54.0 fl Final     MPV   Date Value Ref Range Status   06/06/2020 10.5 6.0 - 12.0 fL Final     Platelets   Date Value Ref Range Status   06/06/2020 254 140 - 450 10*3/mm3 Final     Neutrophil %   Date Value Ref Range Status   06/06/2020 65.9 42.7 - 76.0 % Final     Lymphocyte %   Date Value Ref Range Status   06/06/2020 24.8 19.6 - 45.3 % Final     Monocyte %   Date Value Ref Range Status   06/06/2020 7.0 5.0 - 12.0 % Final     Eosinophil %   Date Value Ref Range Status   06/06/2020 1.3 0.3 - 6.2 % Final     Basophil %   Date Value Ref Range Status   06/06/2020 0.7 0.0 - 1.5 % Final     Immature Grans %   Date Value Ref Range Status   06/06/2020 0.3 0.0 - 0.5 % Final     Neutrophils, Absolute   Date Value Ref Range Status   06/06/2020 4.48 1.70 - 7.00 10*3/mm3 Final     Lymphocytes, Absolute   Date Value Ref Range Status   06/06/2020 1.69 0.70 - 3.10 10*3/mm3 Final     Monocytes, Absolute   Date Value Ref Range Status   06/06/2020 0.48 0.10 - 0.90 10*3/mm3 Final     Eosinophils, Absolute   Date Value Ref Range Status   06/06/2020 0.09 0.00 - 0.40 10*3/mm3 Final     Basophils, Absolute   Date Value Ref Range Status   06/06/2020 0.05 0.00 - 0.20 10*3/mm3 Final     Immature Grans, Absolute   Date Value Ref Range Status   06/06/2020 0.02 0.00 - 0.05 10*3/mm3 Final     nRBC   Date Value Ref Range Status   06/06/2020 0.0 0.0 - 0.2 /100 WBC Final         OBJECTIVE:  Visit Vitals  /80 (BP Location: Left arm, Patient Position: Sitting, Cuff Size: Adult)   Pulse 71   Temp 97.8 °F (36.6 °C) (Oral)   Resp 16   Ht 182.9 cm (72.01\")   Wt 72.6 kg (160 lb)   SpO2 98%   BMI 21.70 kg/m²      Physical Exam  Vitals and nursing note reviewed.   Constitutional:       General: He is not in acute distress.     Appearance: He is well-developed. He is not ill-appearing or toxic-appearing.   HENT:      Head: Normocephalic and atraumatic.   Eyes:      Pupils: Pupils are equal, round, and reactive to light.   Neck:      Thyroid: No " thyromegaly.      Trachea: Phonation normal.   Pulmonary:      Effort: No respiratory distress.   Skin:     Coloration: Skin is not pale.      Findings: No erythema.      Comments: There is no rash on his upper back, lower back or upper arms.  There were some areas of excoriation from previous scratching, no signs of infection.   Neurological:      Mental Status: He is alert.   Psychiatric:         Behavior: Behavior normal.         Thought Content: Thought content normal.         Judgment: Judgment normal.         Assessment/Plan    Diagnoses and all orders for this visit:    1. Dermatitis (Primary)  -     hydrocortisone 1 % cream; Apply 1 application topically to the appropriate area as directed 2 (Two) Times a Day for 14 days.  Dispense: 120 g; Refill: 0        Like to prescribe a course of hydrocortisone cream for the dermatitis.  He has been sweating a little bit more at night over the last couple weeks and symptoms could be related to a heat type rash.  Discussed trying to stay cool especially at night, and trying to take cooler showers as well.  He will let us know in about 1 week if his symptoms have not improved.    Keep next follow-up or sooner if indicated.    Return for Next scheduled follow up.      SIMONA Stevens MD  08:59 CDT  4/26/2022